# Patient Record
Sex: FEMALE | Race: BLACK OR AFRICAN AMERICAN | NOT HISPANIC OR LATINO | Employment: FULL TIME | URBAN - METROPOLITAN AREA
[De-identification: names, ages, dates, MRNs, and addresses within clinical notes are randomized per-mention and may not be internally consistent; named-entity substitution may affect disease eponyms.]

---

## 2017-06-04 PROBLEM — E11.29 TYPE 2 DIABETES MELLITUS WITH MICROALBUMINURIA, WITHOUT LONG-TERM CURRENT USE OF INSULIN: Status: ACTIVE | Noted: 2017-06-04

## 2017-06-04 PROBLEM — M1A.09X0 IDIOPATHIC CHRONIC GOUT OF MULTIPLE SITES WITHOUT TOPHUS: Status: ACTIVE | Noted: 2017-06-04

## 2017-06-04 PROBLEM — R80.9 TYPE 2 DIABETES MELLITUS WITH MICROALBUMINURIA, WITHOUT LONG-TERM CURRENT USE OF INSULIN: Status: ACTIVE | Noted: 2017-06-04

## 2017-10-04 ENCOUNTER — TELEPHONE (OUTPATIENT)
Dept: ADMINISTRATIVE | Facility: HOSPITAL | Age: 47
End: 2017-10-04

## 2018-02-28 PROBLEM — R60.0 LOCALIZED EDEMA: Status: ACTIVE | Noted: 2018-02-28

## 2018-02-28 PROBLEM — E87.6 HYPOKALEMIA: Status: ACTIVE | Noted: 2018-02-28

## 2018-06-06 PROBLEM — F51.01 PRIMARY INSOMNIA: Status: ACTIVE | Noted: 2018-06-06

## 2018-06-06 PROBLEM — G47.33 OSA ON CPAP: Status: ACTIVE | Noted: 2018-06-06

## 2018-08-04 PROBLEM — N94.89 ADNEXAL MASS: Status: ACTIVE | Noted: 2018-08-04

## 2018-09-18 PROBLEM — Z01.818 PRE-OP EXAM: Status: ACTIVE | Noted: 2018-09-18

## 2018-10-03 PROBLEM — N83.209 CYST OF OVARY: Status: ACTIVE | Noted: 2018-10-03

## 2018-10-03 PROBLEM — Z98.890 S/P LAPAROSCOPIC PROCEDURE: Status: ACTIVE | Noted: 2018-10-03

## 2018-10-03 PROBLEM — N83.209 CYST OF OVARY: Status: RESOLVED | Noted: 2018-10-03 | Resolved: 2018-10-03

## 2018-10-19 ENCOUNTER — TELEPHONE (OUTPATIENT)
Dept: ADMINISTRATIVE | Facility: HOSPITAL | Age: 48
End: 2018-10-19

## 2019-01-16 PROCEDURE — 88312 SPECIAL STAINS GROUP 1: CPT | Performed by: PATHOLOGY

## 2019-01-16 PROCEDURE — 88341 IMHCHEM/IMCYTCHM EA ADD ANTB: CPT | Performed by: PATHOLOGY

## 2019-01-16 PROCEDURE — 88342 IMHCHEM/IMCYTCHM 1ST ANTB: CPT | Performed by: PATHOLOGY

## 2019-02-01 ENCOUNTER — TELEPHONE (OUTPATIENT)
Dept: ADMINISTRATIVE | Facility: HOSPITAL | Age: 49
End: 2019-02-01

## 2019-03-13 PROBLEM — G44.209 TENSION HEADACHE: Status: ACTIVE | Noted: 2019-03-13
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
## 2019-03-13 PROBLEM — S46.911A SHOULDER STRAIN, RIGHT, INITIAL ENCOUNTER: Status: ACTIVE | Noted: 2019-03-13

## 2019-03-28 PROBLEM — Z01.818 PRE-OP EXAM: Status: RESOLVED | Noted: 2018-09-18 | Resolved: 2019-03-28
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
## 2019-07-17 PROBLEM — Z86.0100 HX OF COLONIC POLYPS: Status: ACTIVE | Noted: 2019-07-17

## 2019-07-17 PROBLEM — Z86.010 HX OF COLONIC POLYPS: Status: ACTIVE | Noted: 2019-07-17

## 2019-11-22 ENCOUNTER — PATIENT OUTREACH (OUTPATIENT)
Dept: ADMINISTRATIVE | Facility: HOSPITAL | Age: 49
End: 2019-11-22

## 2020-02-28 PROBLEM — R06.02 SOB (SHORTNESS OF BREATH): Status: ACTIVE | Noted: 2020-02-28

## 2020-02-28 PROBLEM — Z86.0100 HX OF COLONIC POLYPS: Status: RESOLVED | Noted: 2019-07-17 | Resolved: 2020-02-28

## 2020-02-28 PROBLEM — Z86.010 HX OF COLONIC POLYPS: Status: RESOLVED | Noted: 2019-07-17 | Resolved: 2020-02-28

## 2020-06-12 ENCOUNTER — PATIENT OUTREACH (OUTPATIENT)
Dept: ADMINISTRATIVE | Facility: HOSPITAL | Age: 50
End: 2020-06-12

## 2020-06-26 PROBLEM — R31.29 MICROSCOPIC HEMATURIA: Status: ACTIVE | Noted: 2020-06-26

## 2021-06-08 ENCOUNTER — PATIENT OUTREACH (OUTPATIENT)
Dept: ADMINISTRATIVE | Facility: HOSPITAL | Age: 51
End: 2021-06-08

## 2021-06-22 PROBLEM — R07.89 CHEST TIGHTNESS: Status: ACTIVE | Noted: 2021-06-22

## 2021-10-29 LAB
LEFT EYE DM RETINOPATHY: NEGATIVE
RIGHT EYE DM RETINOPATHY: NEGATIVE

## 2021-11-15 ENCOUNTER — PATIENT OUTREACH (OUTPATIENT)
Dept: ADMINISTRATIVE | Facility: HOSPITAL | Age: 51
End: 2021-11-15

## 2021-11-16 ENCOUNTER — PATIENT OUTREACH (OUTPATIENT)
Dept: ADMINISTRATIVE | Facility: HOSPITAL | Age: 51
End: 2021-11-16

## 2022-01-23 DIAGNOSIS — M17.0 BILATERAL PRIMARY OSTEOARTHRITIS OF KNEE: Primary | ICD-10-CM

## 2022-03-07 DIAGNOSIS — Z12.31 OTHER SCREENING MAMMOGRAM: ICD-10-CM

## 2022-04-04 ENCOUNTER — PATIENT MESSAGE (OUTPATIENT)
Dept: ADMINISTRATIVE | Facility: HOSPITAL | Age: 52
End: 2022-04-04

## 2023-01-09 ENCOUNTER — PATIENT MESSAGE (OUTPATIENT)
Dept: ADMINISTRATIVE | Facility: HOSPITAL | Age: 53
End: 2023-01-09
Payer: COMMERCIAL

## 2023-04-03 ENCOUNTER — PATIENT MESSAGE (OUTPATIENT)
Dept: ADMINISTRATIVE | Facility: HOSPITAL | Age: 53
End: 2023-04-03
Payer: COMMERCIAL

## 2023-06-17 DIAGNOSIS — G47.33 OBSTRUCTIVE SLEEP APNEA (ADULT) (PEDIATRIC): Primary | ICD-10-CM

## 2023-07-10 ENCOUNTER — PATIENT MESSAGE (OUTPATIENT)
Dept: ADMINISTRATIVE | Facility: HOSPITAL | Age: 53
End: 2023-07-10
Payer: COMMERCIAL

## 2023-07-19 DIAGNOSIS — Z12.31 OTHER SCREENING MAMMOGRAM: ICD-10-CM

## 2023-10-11 LAB
LEFT EYE DM RETINOPATHY: NEGATIVE
RIGHT EYE DM RETINOPATHY: NEGATIVE

## 2023-10-13 ENCOUNTER — PATIENT OUTREACH (OUTPATIENT)
Dept: ADMINISTRATIVE | Facility: HOSPITAL | Age: 53
End: 2023-10-13
Payer: COMMERCIAL

## 2024-01-24 PROBLEM — J30.1 NON-SEASONAL ALLERGIC RHINITIS DUE TO POLLEN: Status: ACTIVE | Noted: 2024-01-24

## 2024-01-24 PROBLEM — J45.40 RAD (REACTIVE AIRWAY DISEASE) WITH WHEEZING, MODERATE PERSISTENT, UNCOMPLICATED: Status: ACTIVE | Noted: 2024-01-24

## 2024-03-04 DIAGNOSIS — G47.33 OSA ON CPAP: Primary | ICD-10-CM

## 2024-12-26 ENCOUNTER — TELEPHONE (OUTPATIENT)
Dept: ENDOCRINOLOGY | Facility: CLINIC | Age: 54
End: 2024-12-26

## 2025-01-29 ENCOUNTER — TELEPHONE (OUTPATIENT)
Dept: ENDOCRINOLOGY | Facility: CLINIC | Age: 55
End: 2025-01-29

## 2025-01-29 DIAGNOSIS — E66.01 MORBID OBESITY WITH BMI OF 50.0-59.9, ADULT: Primary | ICD-10-CM

## 2025-01-29 NOTE — TELEPHONE ENCOUNTER
----- Message from Ale Lott PA-C sent at 1/29/2025  8:46 AM CST -----  Regarding: Labs  Labs 2 days before appointment please (fasting at 8 am)

## 2025-02-07 ENCOUNTER — LAB VISIT (OUTPATIENT)
Dept: LAB | Facility: HOSPITAL | Age: 55
End: 2025-02-07
Attending: INTERNAL MEDICINE
Payer: COMMERCIAL

## 2025-02-07 DIAGNOSIS — E66.01 MORBID OBESITY WITH BMI OF 50.0-59.9, ADULT: ICD-10-CM

## 2025-02-07 LAB
25(OH)D3+25(OH)D2 SERPL-MCNC: 28 NG/ML (ref 30–96)
ALBUMIN SERPL BCP-MCNC: 3.7 G/DL (ref 3.5–5.2)
ALBUMIN/CREAT UR: 3.7 UG/MG (ref 0–30)
ALP SERPL-CCNC: 74 U/L (ref 55–135)
ALT SERPL W/O P-5'-P-CCNC: 14 U/L (ref 10–44)
ANION GAP SERPL CALC-SCNC: 9 MMOL/L (ref 8–16)
AST SERPL-CCNC: 21 U/L (ref 10–40)
BILIRUB SERPL-MCNC: 0.4 MG/DL (ref 0.1–1)
BUN SERPL-MCNC: 10 MG/DL (ref 6–20)
CALCIUM SERPL-MCNC: 9.1 MG/DL (ref 8.7–10.5)
CHLORIDE SERPL-SCNC: 106 MMOL/L (ref 95–110)
CHOLEST SERPL-MCNC: 201 MG/DL (ref 120–199)
CHOLEST/HDLC SERPL: 3.8 {RATIO} (ref 2–5)
CO2 SERPL-SCNC: 27 MMOL/L (ref 23–29)
CORTIS SERPL-MCNC: 5.8 UG/DL (ref 4.3–22.4)
CREAT SERPL-MCNC: 1 MG/DL (ref 0.5–1.4)
CREAT UR-MCNC: 272.4 MG/DL (ref 15–325)
EST. GFR  (NO RACE VARIABLE): >60 ML/MIN/1.73 M^2
ESTIMATED AVG GLUCOSE: 120 MG/DL (ref 68–131)
GLUCOSE SERPL-MCNC: 91 MG/DL (ref 70–110)
HBA1C MFR BLD: 5.8 % (ref 4–5.6)
HDLC SERPL-MCNC: 53 MG/DL (ref 40–75)
HDLC SERPL: 26.4 % (ref 20–50)
INSULIN COLLECTION INTERVAL: 0
INSULIN SERPL-ACNC: 10.2 UU/ML
LDLC SERPL CALC-MCNC: 131 MG/DL (ref 63–159)
MICROALBUMIN UR DL<=1MG/L-MCNC: 10 UG/ML
NONHDLC SERPL-MCNC: 148 MG/DL
POTASSIUM SERPL-SCNC: 4.3 MMOL/L (ref 3.5–5.1)
PROT SERPL-MCNC: 7.4 G/DL (ref 6–8.4)
SODIUM SERPL-SCNC: 142 MMOL/L (ref 136–145)
TRIGL SERPL-MCNC: 85 MG/DL (ref 30–150)
TSH SERPL DL<=0.005 MIU/L-ACNC: 1.08 UIU/ML (ref 0.4–4)

## 2025-02-07 PROCEDURE — 83036 HEMOGLOBIN GLYCOSYLATED A1C: CPT | Performed by: PHYSICIAN ASSISTANT

## 2025-02-07 PROCEDURE — 82043 UR ALBUMIN QUANTITATIVE: CPT | Performed by: PHYSICIAN ASSISTANT

## 2025-02-07 PROCEDURE — 36415 COLL VENOUS BLD VENIPUNCTURE: CPT | Performed by: PHYSICIAN ASSISTANT

## 2025-02-07 PROCEDURE — 82533 TOTAL CORTISOL: CPT | Performed by: PHYSICIAN ASSISTANT

## 2025-02-07 PROCEDURE — 82306 VITAMIN D 25 HYDROXY: CPT | Performed by: PHYSICIAN ASSISTANT

## 2025-02-07 PROCEDURE — 80053 COMPREHEN METABOLIC PANEL: CPT | Performed by: PHYSICIAN ASSISTANT

## 2025-02-07 PROCEDURE — 83525 ASSAY OF INSULIN: CPT | Performed by: PHYSICIAN ASSISTANT

## 2025-02-07 PROCEDURE — 84443 ASSAY THYROID STIM HORMONE: CPT | Performed by: PHYSICIAN ASSISTANT

## 2025-02-07 PROCEDURE — 80061 LIPID PANEL: CPT | Performed by: PHYSICIAN ASSISTANT

## 2025-02-10 ENCOUNTER — OFFICE VISIT (OUTPATIENT)
Dept: ENDOCRINOLOGY | Facility: CLINIC | Age: 55
End: 2025-02-10
Payer: COMMERCIAL

## 2025-02-10 VITALS
DIASTOLIC BLOOD PRESSURE: 77 MMHG | WEIGHT: 293 LBS | BODY MASS INDEX: 45.99 KG/M2 | HEART RATE: 79 BPM | HEIGHT: 67 IN | RESPIRATION RATE: 18 BRPM | OXYGEN SATURATION: 98 % | SYSTOLIC BLOOD PRESSURE: 154 MMHG

## 2025-02-10 DIAGNOSIS — R80.9 TYPE 2 DIABETES MELLITUS WITH MICROALBUMINURIA, WITHOUT LONG-TERM CURRENT USE OF INSULIN: Primary | ICD-10-CM

## 2025-02-10 DIAGNOSIS — E78.2 MIXED HYPERLIPIDEMIA: ICD-10-CM

## 2025-02-10 DIAGNOSIS — E66.01 MORBID OBESITY WITH BMI OF 50.0-59.9, ADULT: ICD-10-CM

## 2025-02-10 DIAGNOSIS — E55.9 VITAMIN D DEFICIENCY: ICD-10-CM

## 2025-02-10 DIAGNOSIS — E11.29 TYPE 2 DIABETES MELLITUS WITH MICROALBUMINURIA, WITHOUT LONG-TERM CURRENT USE OF INSULIN: Primary | ICD-10-CM

## 2025-02-10 PROCEDURE — 3044F HG A1C LEVEL LT 7.0%: CPT | Mod: CPTII,S$GLB,, | Performed by: PHYSICIAN ASSISTANT

## 2025-02-10 PROCEDURE — 3077F SYST BP >= 140 MM HG: CPT | Mod: CPTII,S$GLB,, | Performed by: PHYSICIAN ASSISTANT

## 2025-02-10 PROCEDURE — 3008F BODY MASS INDEX DOCD: CPT | Mod: CPTII,S$GLB,, | Performed by: PHYSICIAN ASSISTANT

## 2025-02-10 PROCEDURE — 3078F DIAST BP <80 MM HG: CPT | Mod: CPTII,S$GLB,, | Performed by: PHYSICIAN ASSISTANT

## 2025-02-10 PROCEDURE — 99999 PR PBB SHADOW E&M-EST. PATIENT-LVL V: CPT | Mod: PBBFAC,,, | Performed by: PHYSICIAN ASSISTANT

## 2025-02-10 PROCEDURE — 3061F NEG MICROALBUMINURIA REV: CPT | Mod: CPTII,S$GLB,, | Performed by: PHYSICIAN ASSISTANT

## 2025-02-10 PROCEDURE — 99204 OFFICE O/P NEW MOD 45 MIN: CPT | Mod: S$GLB,,, | Performed by: PHYSICIAN ASSISTANT

## 2025-02-10 PROCEDURE — 1159F MED LIST DOCD IN RCRD: CPT | Mod: CPTII,S$GLB,, | Performed by: PHYSICIAN ASSISTANT

## 2025-02-10 PROCEDURE — 3066F NEPHROPATHY DOC TX: CPT | Mod: CPTII,S$GLB,, | Performed by: PHYSICIAN ASSISTANT

## 2025-02-10 RX ORDER — ATORVASTATIN CALCIUM 40 MG/1
40 TABLET, FILM COATED ORAL DAILY
Qty: 90 TABLET | Refills: 3 | Status: SHIPPED | OUTPATIENT
Start: 2025-02-10 | End: 2026-02-10

## 2025-02-10 RX ORDER — TIRZEPATIDE 12.5 MG/.5ML
12.5 INJECTION, SOLUTION SUBCUTANEOUS
Qty: 4 PEN | Refills: 11 | Status: SHIPPED | OUTPATIENT
Start: 2025-02-10

## 2025-02-10 NOTE — ASSESSMENT & PLAN NOTE
Last Vitamin D improved to 28 from 12. She is inconsistent with taking D3 50,000 IU once weekly. Encouraged consistency.

## 2025-02-10 NOTE — ASSESSMENT & PLAN NOTE
Glucose controlled with A1c of 5.8%. POCT glucose between . She reports plateau in weight loss. Will switch to GLP-1/GIP RA. She is taking Glipizide 5 mg as needed for large meals but is onkly taking every 2 weeks. Will stop     Plan   - Stop Ozempic 2 mg once weekly  - Start Mounjaro 12.5 mg once weekly   - Stop Glipizide     Follow up 2 months

## 2025-02-10 NOTE — PROGRESS NOTES
Subjective:      Patient ID: Ian Dent is a 54 y.o. female.    Chief Complaint:  T2DM    History of Present Illness  This is a 54 y.o. female. with a past medical history of T2DM, HTN, HLD here for evaluation of T2DM.    Type 2 diabetes mellitus  Diagnosed around age 50    Current diabetes medications:  - Ozempic 2 mg once weekly   - Glipizide 5 mg once daily PRN     Past diabetes medications:  - Mounjaro - drug shortage   - Farxiga - UTI and yeast infection   - Metformin   - Jardiance - UTI and yeast infection, [facility]EN     Lab Results   Component Value Date    CREATININE 1.0 2025    EGFRNORACEVR >60.0 2025     Known diabetic complications: none    Weight trend:  Wt Readings from Last 8 Encounters:   02/10/25 (!) 165.3 kg (364 lb 6.4 oz)   25 (!) 163.8 kg (361 lb 1.8 oz)   24 (!) 162.4 kg (358 lb 0.4 oz)   24 (!) 159.6 kg (351 lb 13.7 oz)   24 (!) 159.2 kg (351 lb)   24 (!) 158.1 kg (348 lb 8.8 oz)   24 (!) 159.5 kg (351 lb 10.1 oz)   24 (!) 159 kg (350 lb 8.5 oz)     Family history of diabetes:  Father, Mother    Prior visit with diabetes education: Yes    Current diet: 2-3 meals per day. She is a  so that limits her meals at times  Current exercise: She states she used to be active, but she is currently in a camper which has limited her exercise.     Blood glucose monitoring at home: Yes  Home blood sugar records:    Fastin-120  Any episodes of hypoglycemia? Yes, 60s    Diabetes Management Status  Statin: Taking  ACE/ARB: Taking    Screening or Prevention Patient's value   HgA1C Testing and Control   Lab Results   Component Value Date    HGBA1C 5.8 (H) 2025        LDL control Lab Results   Component Value Date    LDLCALC 131.0 2025      Nephropathy screening Lab Results   Component Value Date    MICALBCREAT 3.7 2025        Lab Results   Component Value Date    TSH 1.076 2025     Last eye exam: :  "10/11/2023    Review of Systems  As above    Social and family history reviewed  Current medications and allergies reviewed    Objective:   BP (!) 154/77   Pulse 79   Resp 18   Ht 5' 7" (1.702 m)   Wt (!) 165.3 kg (364 lb 6.4 oz)   SpO2 98%   BMI 57.07 kg/m²   Physical Exam  Alert, oriented    BP Readings from Last 1 Encounters:   02/10/25 (!) 154/77      Wt Readings from Last 1 Encounters:   02/10/25 0938 (!) 165.3 kg (364 lb 6.4 oz)     Body mass index is 57.07 kg/m².    Lab Review:   Lab Results   Component Value Date    HGBA1C 5.8 (H) 02/07/2025     Lab Results   Component Value Date    CHOL 201 (H) 02/07/2025    HDL 53 02/07/2025    LDLCALC 131.0 02/07/2025    TRIG 85 02/07/2025    CHOLHDL 26.4 02/07/2025     Lab Results   Component Value Date     02/07/2025    K 4.3 02/07/2025     02/07/2025    CO2 27 02/07/2025    GLU 91 02/07/2025    BUN 10 02/07/2025    CREATININE 1.0 02/07/2025    CALCIUM 9.1 02/07/2025    PROT 7.4 02/07/2025    ALBUMIN 3.7 02/07/2025    BILITOT 0.4 02/07/2025    ALKPHOS 74 02/07/2025    AST 21 02/07/2025    ALT 14 02/07/2025    ANIONGAP 9 02/07/2025    ESTGFRAFRICA >60.0 06/13/2022    EGFRNONAA >60.0 06/13/2022    TSH 1.076 02/07/2025     All pertinent labs reviewed    Assessment and Plan     Type 2 diabetes mellitus with microalbuminuria, without long-term current use of insulin  Glucose controlled with A1c of 5.8%. POCT glucose between . She reports plateau in weight loss. Will switch to GLP-1/GIP RA. She is taking Glipizide 5 mg as needed for large meals but is onkly taking every 2 weeks. Will stop     Plan   - Stop Ozempic 2 mg once weekly  - Start Mounjaro 12.5 mg once weekly   - Stop Glipizide     Follow up 2 months    Morbid obesity with BMI of 50.0-59.9, adult  Stop GLP-1 RA and start GLP-1/GIP RA for weight loss benefit     Vitamin D deficiency  Last Vitamin D improved to 28 from 12. She is inconsistent with taking D3 50,000 IU once weekly. Encouraged " consistency.     Mixed hyperlipidemia  Last LD of 131. Currently on Pravastatin 40 mg once weekly. ASCVD risk of 7.6%. High intensity statin needed. Switch to Atorvastatin 40 mg     Follow-up in 2 months    Ale Lott PA-C   Endocrinology    100

## 2025-02-10 NOTE — ASSESSMENT & PLAN NOTE
Last LD of 131. Currently on Pravastatin 40 mg once weekly. ASCVD risk of 7.6%. High intensity statin needed. Switch to Atorvastatin 40 mg

## 2025-03-31 ENCOUNTER — PATIENT MESSAGE (OUTPATIENT)
Dept: ADMINISTRATIVE | Facility: HOSPITAL | Age: 55
End: 2025-03-31
Payer: COMMERCIAL

## 2025-03-31 ENCOUNTER — OFFICE VISIT (OUTPATIENT)
Dept: ENDOCRINOLOGY | Facility: CLINIC | Age: 55
End: 2025-03-31
Payer: COMMERCIAL

## 2025-03-31 VITALS
HEIGHT: 67 IN | RESPIRATION RATE: 18 BRPM | OXYGEN SATURATION: 97 % | DIASTOLIC BLOOD PRESSURE: 75 MMHG | SYSTOLIC BLOOD PRESSURE: 134 MMHG | BODY MASS INDEX: 45.99 KG/M2 | HEART RATE: 73 BPM | WEIGHT: 293 LBS

## 2025-03-31 DIAGNOSIS — R80.9 TYPE 2 DIABETES MELLITUS WITH MICROALBUMINURIA, WITHOUT LONG-TERM CURRENT USE OF INSULIN: Primary | ICD-10-CM

## 2025-03-31 DIAGNOSIS — E78.2 MIXED HYPERLIPIDEMIA: ICD-10-CM

## 2025-03-31 DIAGNOSIS — E66.01 MORBID OBESITY WITH BMI OF 50.0-59.9, ADULT: Primary | ICD-10-CM

## 2025-03-31 DIAGNOSIS — E11.29 TYPE 2 DIABETES MELLITUS WITH MICROALBUMINURIA, WITHOUT LONG-TERM CURRENT USE OF INSULIN: Primary | ICD-10-CM

## 2025-03-31 DIAGNOSIS — E66.01 MORBID OBESITY WITH BMI OF 50.0-59.9, ADULT: ICD-10-CM

## 2025-03-31 PROCEDURE — 3044F HG A1C LEVEL LT 7.0%: CPT | Mod: CPTII,S$GLB,, | Performed by: PHYSICIAN ASSISTANT

## 2025-03-31 PROCEDURE — 3075F SYST BP GE 130 - 139MM HG: CPT | Mod: CPTII,S$GLB,, | Performed by: PHYSICIAN ASSISTANT

## 2025-03-31 PROCEDURE — 3066F NEPHROPATHY DOC TX: CPT | Mod: CPTII,S$GLB,, | Performed by: PHYSICIAN ASSISTANT

## 2025-03-31 PROCEDURE — 3061F NEG MICROALBUMINURIA REV: CPT | Mod: CPTII,S$GLB,, | Performed by: PHYSICIAN ASSISTANT

## 2025-03-31 PROCEDURE — 99999 PR PBB SHADOW E&M-EST. PATIENT-LVL V: CPT | Mod: PBBFAC,,, | Performed by: PHYSICIAN ASSISTANT

## 2025-03-31 PROCEDURE — 1159F MED LIST DOCD IN RCRD: CPT | Mod: CPTII,S$GLB,, | Performed by: PHYSICIAN ASSISTANT

## 2025-03-31 PROCEDURE — 99214 OFFICE O/P EST MOD 30 MIN: CPT | Mod: S$GLB,,, | Performed by: PHYSICIAN ASSISTANT

## 2025-03-31 PROCEDURE — G2211 COMPLEX E/M VISIT ADD ON: HCPCS | Mod: S$GLB,,, | Performed by: PHYSICIAN ASSISTANT

## 2025-03-31 PROCEDURE — 3008F BODY MASS INDEX DOCD: CPT | Mod: CPTII,S$GLB,, | Performed by: PHYSICIAN ASSISTANT

## 2025-03-31 PROCEDURE — 3078F DIAST BP <80 MM HG: CPT | Mod: CPTII,S$GLB,, | Performed by: PHYSICIAN ASSISTANT

## 2025-03-31 NOTE — PROGRESS NOTES
Subjective:      Patient ID: Ian Dent is a 54 y.o. female.    Chief Complaint:  T2DM    History of Present Illness  This is a 54 y.o. female. with a past medical history of T2DM, HTN, HLD here for evaluation of T2DM.    Type 2 diabetes mellitus  Diagnosed around age 50    Current diabetes medications:  - Mounjaro 12.5 mg once weekly     Past diabetes medications:  - Farxiga - UTI and yeast infection   - Metformin   - Jardiance - UTI and yeast infection, [facility]   - Ozempic - failure   - Glipizide    Lab Results   Component Value Date    CREATININE 1.0 02/07/2025    EGFRNORACEVR >60.0 02/07/2025     Known diabetic complications: none    Weight trend:  Wt Readings from Last 8 Encounters:   03/31/25 (!) 160.7 kg (354 lb 3.2 oz)   02/10/25 (!) 165.3 kg (364 lb 6.4 oz)   02/05/25 (!) 163.8 kg (361 lb 1.8 oz)   11/07/24 (!) 162.4 kg (358 lb 0.4 oz)   09/30/24 (!) 159.6 kg (351 lb 13.7 oz)   07/24/24 (!) 159.2 kg (351 lb)   05/08/24 (!) 158.1 kg (348 lb 8.8 oz)   04/29/24 (!) 159.5 kg (351 lb 10.1 oz)     Family history of diabetes:  Father, Mother    Prior visit with diabetes education: Yes    Current diet: 2-3 meals per day. She is a  so that limits her meals at times  Current exercise: She states she used to be active, but she is currently in a camper which has limited her exercise.     Blood glucose monitoring at home: No     Diabetes Management Status  Statin: Taking  ACE/ARB: Taking    Screening or Prevention Patient's value   HgA1C Testing and Control   Lab Results   Component Value Date    HGBA1C 5.8 (H) 02/07/2025        LDL control Lab Results   Component Value Date    LDLCALC 131.0 02/07/2025      Nephropathy screening Lab Results   Component Value Date    MICALBCREAT 3.7 02/07/2025        Lab Results   Component Value Date    TSH 1.076 02/07/2025     Last eye exam: : 10/11/2023    Review of Systems  As above    Social and family history reviewed  Current medications and allergies  "reviewed    Objective:   /75   Pulse 73   Resp 18   Ht 5' 7" (1.702 m)   Wt (!) 160.7 kg (354 lb 3.2 oz)   SpO2 97%   BMI 55.48 kg/m²   Physical Exam  Alert, oriented    BP Readings from Last 1 Encounters:   03/31/25 134/75      Wt Readings from Last 1 Encounters:   03/31/25 0958 (!) 160.7 kg (354 lb 3.2 oz)     Body mass index is 55.48 kg/m².    Lab Review:   Lab Results   Component Value Date    HGBA1C 5.8 (H) 02/07/2025     Lab Results   Component Value Date    CHOL 201 (H) 02/07/2025    HDL 53 02/07/2025    LDLCALC 131.0 02/07/2025    TRIG 85 02/07/2025    CHOLHDL 26.4 02/07/2025     Lab Results   Component Value Date     02/07/2025    K 4.3 02/07/2025     02/07/2025    CO2 27 02/07/2025    GLU 91 02/07/2025    BUN 10 02/07/2025    CREATININE 1.0 02/07/2025    CALCIUM 9.1 02/07/2025    PROT 7.4 02/07/2025    ALBUMIN 3.7 02/07/2025    BILITOT 0.4 02/07/2025    ALKPHOS 74 02/07/2025    AST 21 02/07/2025    ALT 14 02/07/2025    ANIONGAP 9 02/07/2025    ESTGFRAFRICA >60.0 06/13/2022    EGFRNONAA >60.0 06/13/2022    TSH 1.076 02/07/2025     All pertinent labs reviewed    Assessment and Plan     Type 2 diabetes mellitus with microalbuminuria, without long-term current use of insulin  Glucose controlled with A1c of 5.8%. She is tolerating GLP-1/GIP RA well without any side effects. She has lost 10 pounds in 7 weeks. I congratulated her on this. She is interested in weight loss surgery. Will send referral. She has been exercising multiple times per week and has improved her diet. Will continue same dose of Mounjaro as she has seen progressive weight loss since starting. Could consider increasing in 2-3 months if weight plateaus.     Plan   - Continue Mounjaro 12.5 mg once weekly     Follow up 5 months     Morbid obesity with BMI of 50.0-59.9, adult  Continue GLP-1/GIP RA for weight loss benefit     Mixed hyperlipidemia  Continue statin    Ale Lott PA-C   Endocrinology   "

## 2025-03-31 NOTE — ASSESSMENT & PLAN NOTE
Glucose controlled with A1c of 5.8%. She is tolerating GLP-1/GIP RA well without any side effects. She has lost 10 pounds in 7 weeks. I congratulated her on this. She is interested in weight loss surgery. Will send referral. She has been exercising multiple times per week and has improved her diet. Will continue same dose of Mounjaro as she has seen progressive weight loss since starting. Could consider increasing in 2-3 months if weight plateaus.     Plan   - Continue Mounjaro 12.5 mg once weekly     Follow up 5 months

## 2025-04-01 ENCOUNTER — TELEPHONE (OUTPATIENT)
Dept: BARIATRICS | Facility: CLINIC | Age: 55
End: 2025-04-01
Payer: COMMERCIAL

## 2025-04-09 ENCOUNTER — TELEPHONE (OUTPATIENT)
Dept: BARIATRICS | Facility: CLINIC | Age: 55
End: 2025-04-09
Payer: COMMERCIAL

## 2025-04-10 ENCOUNTER — TELEPHONE (OUTPATIENT)
Dept: BARIATRICS | Facility: CLINIC | Age: 55
End: 2025-04-10
Payer: COMMERCIAL

## 2025-04-10 NOTE — TELEPHONE ENCOUNTER
Called PT to schedule consults. No answer. Left voice message.   Patient's daughter, Gayle Stallworth, called. Patient has had increased leg swelling for the past few days and she has been more tired than usual.  Patient currently takes Lasix 20 mg every other day. Should she increase this? Please advise.

## 2025-04-13 DIAGNOSIS — R80.9 TYPE 2 DIABETES MELLITUS WITH MICROALBUMINURIA, WITHOUT LONG-TERM CURRENT USE OF INSULIN: ICD-10-CM

## 2025-04-13 DIAGNOSIS — E11.29 TYPE 2 DIABETES MELLITUS WITH MICROALBUMINURIA, WITHOUT LONG-TERM CURRENT USE OF INSULIN: ICD-10-CM

## 2025-04-13 RX ORDER — TIRZEPATIDE 12.5 MG/.5ML
12.5 INJECTION, SOLUTION SUBCUTANEOUS
Qty: 4 PEN | Refills: 11 | Status: CANCELLED | OUTPATIENT
Start: 2025-04-13

## 2025-04-14 ENCOUNTER — TELEPHONE (OUTPATIENT)
Dept: BARIATRICS | Facility: CLINIC | Age: 55
End: 2025-04-14
Payer: COMMERCIAL

## 2025-04-14 DIAGNOSIS — E11.29 TYPE 2 DIABETES MELLITUS WITH MICROALBUMINURIA, WITHOUT LONG-TERM CURRENT USE OF INSULIN: ICD-10-CM

## 2025-04-14 DIAGNOSIS — R80.9 TYPE 2 DIABETES MELLITUS WITH MICROALBUMINURIA, WITHOUT LONG-TERM CURRENT USE OF INSULIN: ICD-10-CM

## 2025-04-14 RX ORDER — TIRZEPATIDE 12.5 MG/.5ML
12.5 INJECTION, SOLUTION SUBCUTANEOUS
Qty: 4 PEN | Refills: 11 | Status: SHIPPED | OUTPATIENT
Start: 2025-04-14

## 2025-04-21 ENCOUNTER — LAB VISIT (OUTPATIENT)
Dept: LAB | Facility: HOSPITAL | Age: 55
End: 2025-04-21
Attending: INTERNAL MEDICINE
Payer: COMMERCIAL

## 2025-04-21 ENCOUNTER — TELEPHONE (OUTPATIENT)
Dept: BARIATRICS | Facility: CLINIC | Age: 55
End: 2025-04-21
Payer: COMMERCIAL

## 2025-04-21 DIAGNOSIS — R80.9 TYPE 2 DIABETES MELLITUS WITH MICROALBUMINURIA, WITHOUT LONG-TERM CURRENT USE OF INSULIN: ICD-10-CM

## 2025-04-21 DIAGNOSIS — E11.29 TYPE 2 DIABETES MELLITUS WITH MICROALBUMINURIA, WITHOUT LONG-TERM CURRENT USE OF INSULIN: ICD-10-CM

## 2025-04-21 DIAGNOSIS — I10 ESSENTIAL HYPERTENSION: ICD-10-CM

## 2025-04-21 DIAGNOSIS — E78.2 MIXED HYPERLIPIDEMIA: ICD-10-CM

## 2025-04-21 LAB
ABSOLUTE EOSINOPHIL (OHS): 0.06 K/UL
ABSOLUTE MONOCYTE (OHS): 0.45 K/UL (ref 0.3–1)
ABSOLUTE NEUTROPHIL COUNT (OHS): 3.61 K/UL (ref 1.8–7.7)
ALBUMIN SERPL BCP-MCNC: 3.4 G/DL (ref 3.5–5.2)
ALP SERPL-CCNC: 86 UNIT/L (ref 40–150)
ALT SERPL W/O P-5'-P-CCNC: 13 UNIT/L (ref 10–44)
ANION GAP (OHS): 8 MMOL/L (ref 8–16)
AST SERPL-CCNC: 14 UNIT/L (ref 11–45)
BACTERIA #/AREA URNS AUTO: ABNORMAL /HPF
BASOPHILS # BLD AUTO: 0.03 K/UL
BASOPHILS NFR BLD AUTO: 0.4 %
BILIRUB SERPL-MCNC: 0.4 MG/DL (ref 0.1–1)
BILIRUB UR QL STRIP.AUTO: NEGATIVE
BUN SERPL-MCNC: 10 MG/DL (ref 6–20)
CALCIUM SERPL-MCNC: 8.6 MG/DL (ref 8.7–10.5)
CHLORIDE SERPL-SCNC: 104 MMOL/L (ref 95–110)
CHOLEST SERPL-MCNC: 177 MG/DL (ref 120–199)
CHOLEST/HDLC SERPL: 3.4 {RATIO} (ref 2–5)
CLARITY UR: CLEAR
CO2 SERPL-SCNC: 28 MMOL/L (ref 23–29)
COLOR UR AUTO: YELLOW
CREAT SERPL-MCNC: 0.9 MG/DL (ref 0.5–1.4)
EAG (OHS): 123 MG/DL (ref 68–131)
ERYTHROCYTE [DISTWIDTH] IN BLOOD BY AUTOMATED COUNT: 16.5 % (ref 11.5–14.5)
GFR SERPLBLD CREATININE-BSD FMLA CKD-EPI: >60 ML/MIN/1.73/M2
GLUCOSE SERPL-MCNC: 101 MG/DL (ref 70–110)
GLUCOSE UR QL STRIP: NEGATIVE
HBA1C MFR BLD: 5.9 % (ref 4–5.6)
HCT VFR BLD AUTO: 43.4 % (ref 37–48.5)
HDLC SERPL-MCNC: 52 MG/DL (ref 40–75)
HDLC SERPL: 29.4 % (ref 20–50)
HGB BLD-MCNC: 13.5 GM/DL (ref 12–16)
HGB UR QL STRIP: NEGATIVE
HYALINE CASTS UR QL AUTO: 2 /LPF (ref 0–1)
IMM GRANULOCYTES # BLD AUTO: 0.01 K/UL (ref 0–0.04)
IMM GRANULOCYTES NFR BLD AUTO: 0.1 % (ref 0–0.5)
KETONES UR QL STRIP: ABNORMAL
LDLC SERPL CALC-MCNC: 105.2 MG/DL (ref 63–159)
LEUKOCYTE ESTERASE UR QL STRIP: ABNORMAL
LYMPHOCYTES # BLD AUTO: 3.34 K/UL (ref 1–4.8)
MCH RBC QN AUTO: 27 PG (ref 27–31)
MCHC RBC AUTO-ENTMCNC: 31.1 G/DL (ref 32–36)
MCV RBC AUTO: 87 FL (ref 82–98)
MICROSCOPIC COMMENT: ABNORMAL
NITRITE UR QL STRIP: NEGATIVE
NONHDLC SERPL-MCNC: 125 MG/DL
NUCLEATED RBC (/100WBC) (OHS): 0 /100 WBC
PH UR STRIP: 6 [PH]
PLATELET # BLD AUTO: 234 K/UL (ref 150–450)
PMV BLD AUTO: 11.7 FL (ref 9.2–12.9)
POTASSIUM SERPL-SCNC: 4 MMOL/L (ref 3.5–5.1)
PROT SERPL-MCNC: 6.9 GM/DL (ref 6–8.4)
PROT UR QL STRIP: NEGATIVE
RBC # BLD AUTO: 5 M/UL (ref 4–5.4)
RBC #/AREA URNS AUTO: 1 /HPF (ref 0–4)
RELATIVE EOSINOPHIL (OHS): 0.8 %
RELATIVE LYMPHOCYTE (OHS): 44.5 % (ref 18–48)
RELATIVE MONOCYTE (OHS): 6 % (ref 4–15)
RELATIVE NEUTROPHIL (OHS): 48.2 % (ref 38–73)
SODIUM SERPL-SCNC: 140 MMOL/L (ref 136–145)
SP GR UR STRIP: 1.02
SQUAMOUS #/AREA URNS AUTO: 0 /HPF
TRIGL SERPL-MCNC: 99 MG/DL (ref 30–150)
TSH SERPL-ACNC: 1.09 UIU/ML (ref 0.4–4)
UROBILINOGEN UR STRIP-ACNC: 1 EU/DL
WBC # BLD AUTO: 7.5 K/UL (ref 3.9–12.7)
WBC #/AREA URNS AUTO: 1 /HPF (ref 0–5)

## 2025-04-21 PROCEDURE — 84443 ASSAY THYROID STIM HORMONE: CPT

## 2025-04-21 PROCEDURE — 36415 COLL VENOUS BLD VENIPUNCTURE: CPT

## 2025-04-21 PROCEDURE — 81003 URINALYSIS AUTO W/O SCOPE: CPT

## 2025-04-21 PROCEDURE — 80061 LIPID PANEL: CPT

## 2025-04-21 PROCEDURE — 80053 COMPREHEN METABOLIC PANEL: CPT

## 2025-04-21 PROCEDURE — 85025 COMPLETE CBC W/AUTO DIFF WBC: CPT

## 2025-04-21 PROCEDURE — 83036 HEMOGLOBIN GLYCOSYLATED A1C: CPT

## 2025-04-21 NOTE — TELEPHONE ENCOUNTER
Called pt to schedule Micheal consult/Fc appt. No answer, LVM w/cb name and number. Will send portal message.

## 2025-04-21 NOTE — TELEPHONE ENCOUNTER
----- Message from Ale Lott PA-C sent at 3/31/2025 12:47 PM CDT -----  Regarding: Bariatric surgery  Good afternoon, I am Ale Lott PA-C working in [facility] in Endocrinology. I have this patient who is very interested in weight loss surgery. I put in referral and wanted to see if she could be scheduled soon? Thanks, Ale Lott PA-C Endocrinology

## 2025-04-24 ENCOUNTER — TELEPHONE (OUTPATIENT)
Dept: BARIATRICS | Facility: CLINIC | Age: 55
End: 2025-04-24
Payer: COMMERCIAL

## 2025-05-05 ENCOUNTER — TELEPHONE (OUTPATIENT)
Dept: BARIATRICS | Facility: CLINIC | Age: 55
End: 2025-05-05
Payer: COMMERCIAL

## 2025-05-05 NOTE — TELEPHONE ENCOUNTER
----- Message from Chelsey sent at 4/14/2025  4:08 PM CDT -----  Regarding: called to schedule leonela/dietitian consult. lvm/pm 2nd attempt  called to schedule leonela/dietitian consult. lvm/pm 2nd attempt

## 2025-05-23 ENCOUNTER — TELEPHONE (OUTPATIENT)
Dept: BARIATRICS | Facility: CLINIC | Age: 55
End: 2025-05-23
Payer: COMMERCIAL

## 2025-05-23 NOTE — TELEPHONE ENCOUNTER
----- Message from Ale Lott PA-C sent at 3/31/2025 12:47 PM CDT -----  Regarding: Bariatric surgery  Good afternoon, I am Ale Lott PA-C working in [facility] in Endocrinology. I have this patient who is very interested in weight loss surgery. I put in referral and wanted to see if she could be scheduled soon? Thanks, Ale Lott PA-C Endocrinology

## 2025-06-02 ENCOUNTER — CLINICAL SUPPORT (OUTPATIENT)
Dept: BARIATRICS | Facility: CLINIC | Age: 55
End: 2025-06-02
Payer: COMMERCIAL

## 2025-06-02 ENCOUNTER — OFFICE VISIT (OUTPATIENT)
Dept: BARIATRICS | Facility: CLINIC | Age: 55
End: 2025-06-02
Payer: COMMERCIAL

## 2025-06-02 VITALS
TEMPERATURE: 98 F | OXYGEN SATURATION: 98 % | DIASTOLIC BLOOD PRESSURE: 89 MMHG | WEIGHT: 293 LBS | HEART RATE: 84 BPM | SYSTOLIC BLOOD PRESSURE: 143 MMHG | BODY MASS INDEX: 45.99 KG/M2 | HEIGHT: 67 IN

## 2025-06-02 VITALS — BODY MASS INDEX: 45.99 KG/M2 | WEIGHT: 293 LBS | HEIGHT: 67 IN

## 2025-06-02 DIAGNOSIS — E11.29 TYPE 2 DIABETES MELLITUS WITH MICROALBUMINURIA, WITHOUT LONG-TERM CURRENT USE OF INSULIN: ICD-10-CM

## 2025-06-02 DIAGNOSIS — E78.2 MIXED HYPERLIPIDEMIA: ICD-10-CM

## 2025-06-02 DIAGNOSIS — I10 ESSENTIAL HYPERTENSION: Chronic | ICD-10-CM

## 2025-06-02 DIAGNOSIS — E66.01 MORBID OBESITY WITH BMI OF 50.0-59.9, ADULT: Primary | ICD-10-CM

## 2025-06-02 DIAGNOSIS — E66.01 MORBID OBESITY WITH BMI OF 50.0-59.9, ADULT: ICD-10-CM

## 2025-06-02 DIAGNOSIS — K21.00 GASTROESOPHAGEAL REFLUX DISEASE WITH ESOPHAGITIS WITHOUT HEMORRHAGE: Primary | ICD-10-CM

## 2025-06-02 DIAGNOSIS — R80.9 TYPE 2 DIABETES MELLITUS WITH MICROALBUMINURIA, WITHOUT LONG-TERM CURRENT USE OF INSULIN: ICD-10-CM

## 2025-06-02 PROCEDURE — 3079F DIAST BP 80-89 MM HG: CPT | Mod: CPTII,S$GLB,, | Performed by: NURSE PRACTITIONER

## 2025-06-02 PROCEDURE — 97802 MEDICAL NUTRITION INDIV IN: CPT | Mod: S$GLB,,, | Performed by: DIETITIAN, REGISTERED

## 2025-06-02 PROCEDURE — 99999 PR PBB SHADOW E&M-EST. PATIENT-LVL V: CPT | Mod: PBBFAC,,, | Performed by: NURSE PRACTITIONER

## 2025-06-02 PROCEDURE — 3061F NEG MICROALBUMINURIA REV: CPT | Mod: CPTII,S$GLB,, | Performed by: NURSE PRACTITIONER

## 2025-06-02 PROCEDURE — 99205 OFFICE O/P NEW HI 60 MIN: CPT | Mod: S$GLB,,, | Performed by: NURSE PRACTITIONER

## 2025-06-02 PROCEDURE — 1160F RVW MEDS BY RX/DR IN RCRD: CPT | Mod: CPTII,S$GLB,, | Performed by: NURSE PRACTITIONER

## 2025-06-02 PROCEDURE — 3008F BODY MASS INDEX DOCD: CPT | Mod: CPTII,S$GLB,, | Performed by: NURSE PRACTITIONER

## 2025-06-02 PROCEDURE — 3077F SYST BP >= 140 MM HG: CPT | Mod: CPTII,S$GLB,, | Performed by: NURSE PRACTITIONER

## 2025-06-02 PROCEDURE — 1159F MED LIST DOCD IN RCRD: CPT | Mod: CPTII,S$GLB,, | Performed by: NURSE PRACTITIONER

## 2025-06-02 PROCEDURE — 3044F HG A1C LEVEL LT 7.0%: CPT | Mod: CPTII,S$GLB,, | Performed by: NURSE PRACTITIONER

## 2025-06-02 PROCEDURE — 3066F NEPHROPATHY DOC TX: CPT | Mod: CPTII,S$GLB,, | Performed by: NURSE PRACTITIONER

## 2025-06-02 PROCEDURE — 99999 PR PBB SHADOW E&M-EST. PATIENT-LVL II: CPT | Mod: PBBFAC,,, | Performed by: DIETITIAN, REGISTERED

## 2025-06-03 ENCOUNTER — PATIENT MESSAGE (OUTPATIENT)
Dept: BARIATRICS | Facility: CLINIC | Age: 55
End: 2025-06-03
Payer: COMMERCIAL

## 2025-06-03 ENCOUNTER — HOSPITAL ENCOUNTER (OUTPATIENT)
Dept: PULMONOLOGY | Facility: HOSPITAL | Age: 55
Discharge: HOME | End: 2025-06-03
Attending: NURSE PRACTITIONER
Payer: COMMERCIAL

## 2025-06-03 ENCOUNTER — OFFICE VISIT (OUTPATIENT)
Dept: CARDIOLOGY | Facility: CLINIC | Age: 55
End: 2025-06-03
Payer: COMMERCIAL

## 2025-06-03 ENCOUNTER — TELEPHONE (OUTPATIENT)
Dept: BARIATRICS | Facility: CLINIC | Age: 55
End: 2025-06-03
Payer: COMMERCIAL

## 2025-06-03 VITALS
HEIGHT: 67 IN | BODY MASS INDEX: 45.99 KG/M2 | HEART RATE: 80 BPM | OXYGEN SATURATION: 99 % | WEIGHT: 293 LBS | DIASTOLIC BLOOD PRESSURE: 88 MMHG | RESPIRATION RATE: 18 BRPM | SYSTOLIC BLOOD PRESSURE: 143 MMHG

## 2025-06-03 DIAGNOSIS — R80.9 TYPE 2 DIABETES MELLITUS WITH MICROALBUMINURIA, WITHOUT LONG-TERM CURRENT USE OF INSULIN: ICD-10-CM

## 2025-06-03 DIAGNOSIS — R07.89 CHEST TIGHTNESS: ICD-10-CM

## 2025-06-03 DIAGNOSIS — Z71.89 ENCOUNTER FOR PRE-BARIATRIC SURGERY COUNSELING AND EDUCATION: ICD-10-CM

## 2025-06-03 DIAGNOSIS — I10 ESSENTIAL HYPERTENSION: Primary | Chronic | ICD-10-CM

## 2025-06-03 DIAGNOSIS — E66.01 MORBID OBESITY WITH BMI OF 50.0-59.9, ADULT: ICD-10-CM

## 2025-06-03 DIAGNOSIS — E78.2 MIXED HYPERLIPIDEMIA: ICD-10-CM

## 2025-06-03 DIAGNOSIS — Z71.89 CARDIAC RISK COUNSELING: ICD-10-CM

## 2025-06-03 DIAGNOSIS — E11.29 TYPE 2 DIABETES MELLITUS WITH MICROALBUMINURIA, WITHOUT LONG-TERM CURRENT USE OF INSULIN: ICD-10-CM

## 2025-06-03 PROCEDURE — 93005 ELECTROCARDIOGRAM TRACING: CPT

## 2025-06-03 PROCEDURE — 1159F MED LIST DOCD IN RCRD: CPT | Mod: CPTII,S$GLB,, | Performed by: NURSE PRACTITIONER

## 2025-06-03 PROCEDURE — 3044F HG A1C LEVEL LT 7.0%: CPT | Mod: CPTII,S$GLB,, | Performed by: NURSE PRACTITIONER

## 2025-06-03 PROCEDURE — 3077F SYST BP >= 140 MM HG: CPT | Mod: CPTII,S$GLB,, | Performed by: NURSE PRACTITIONER

## 2025-06-03 PROCEDURE — 99204 OFFICE O/P NEW MOD 45 MIN: CPT | Mod: 25,S$GLB,, | Performed by: NURSE PRACTITIONER

## 2025-06-03 PROCEDURE — 3008F BODY MASS INDEX DOCD: CPT | Mod: CPTII,S$GLB,, | Performed by: NURSE PRACTITIONER

## 2025-06-03 PROCEDURE — 3066F NEPHROPATHY DOC TX: CPT | Mod: CPTII,S$GLB,, | Performed by: NURSE PRACTITIONER

## 2025-06-03 PROCEDURE — 93010 ELECTROCARDIOGRAM REPORT: CPT | Mod: ,,, | Performed by: INTERNAL MEDICINE

## 2025-06-03 PROCEDURE — 3079F DIAST BP 80-89 MM HG: CPT | Mod: CPTII,S$GLB,, | Performed by: NURSE PRACTITIONER

## 2025-06-03 PROCEDURE — 3061F NEG MICROALBUMINURIA REV: CPT | Mod: CPTII,S$GLB,, | Performed by: NURSE PRACTITIONER

## 2025-06-03 PROCEDURE — 99999 PR PBB SHADOW E&M-EST. PATIENT-LVL V: CPT | Mod: PBBFAC,,, | Performed by: NURSE PRACTITIONER

## 2025-06-03 NOTE — PROGRESS NOTES
[facility]    CC: Morbid obesity    Chief Complaint   Patient presents with    Hyperlipidemia       Patient ID: Ian Dent is a 54 y.o. female with a past medical history of GERD, morbid obesity,     HPI    Here for pre-op work up for bariatric surgery   Follows [facility] with request for stress testing and EKG from cardiology     Echo 8/2022 normal EF    HTN; did not take BP meds today   Reports SBP 120s normally at home     HLD; on statin     CHA; on CPAP    DMT2; A1c 5.9    Denies [facility]  Nonsmoker     Morbid obesity; good candidate for bariatric surgery    Denies CP, SOB/GIL, orthopnea, PND, syncope, palps, LE edema.          Past Medical History:   Diagnosis Date    Anxiety     Anxiety     Colon polyp 2015    Diabetes     Edema     Environmental and seasonal allergies     Fever blister     Foot pain     GERD (gastroesophageal reflux disease)     Gout     Hyperlipidemia     Hypertension     Insomnia     Morbid obesity     CHA on CPAP     Ovarian cystic mass      Past Surgical History:   Procedure Laterality Date    BTL Bilateral     CHOLECYSTECTOMY      COLONOSCOPY  2009    incomplete 2009    COLONOSCOPY N/A 10/08/2015    polyp--Procedure: COLONOSCOPY;  Surgeon: Maisha Carlton MD;  Location: [facility];  Service: Endoscopy;  Laterality: N/A;    COLONOSCOPY N/A 07/17/2019    Procedure: COLONOSCOPY;  Surgeon: Aleta Lemons MD;  Location: [facility];  Service: Endoscopy;  Laterality: N/A;    COLONOSCOPY N/A 01/11/2024    Procedure: COLONOSCOPY;  Surgeon: Petros Kirk MD;  Location: [facility];  Service: Endoscopy;  Laterality: N/A;    CYSTOSCOPY      DIAGNOSTIC LAPAROSCOPY N/A 10/03/2018    Procedure: LAPAROSCOPY, DIAGNOSTIC;  Surgeon: Marianela Barrera MD;  Location: [facility];  Service: OB/GYN;  Laterality: N/A;    ESOPHAGOGASTRODUODENOSCOPY      HYSTERECTOMY  2012    LAPAROSCOPIC LYSIS OF ADHESIONS N/A 10/03/2018    Procedure: LYSIS, ADHESIONS, LAPAROSCOPIC;  Surgeon: Marianela  MD [place];  Location: [facility];  Service: OB/GYN;  Laterality: N/A;  extensive    LAPAROSCOPIC LYSIS OF ADHESIONS N/A 10/03/2018    Procedure: LYSIS, ADHESIONS, LAPAROSCOPIC;  Surgeon: Brent Warren MD;  Location: [facility];  Service: General;  Laterality: N/A;  extensive    LAPAROSCOPIC SURGICAL REMOVAL OF CYST OF OVARY Right 10/03/2018    Procedure: EXCISION, CYST, OVARY, LAPAROSCOPIC;  Surgeon: Marianela Barrera MD;  Location: [facility];  Service: OB/GYN;  Laterality: Right;    SKIN BIOPSY      TONSILLECTOMY       Social History[1]  Family History   Problem Relation Name Age of Onset    Hyperlipidemia Mother      Hypertension Mother      Stroke Mother      Hypertension Father      Diabetes Father      Stroke Father      Colon cancer Neg Hx         Review of patient's allergies indicates:   Allergen Reactions    Claritin [loratadine] Other (See Comments)     Stomach burn  Nausea and vomiting  Lightheaddness         Medication List with Changes/Refills   Current Medications    ALBUTEROL (PROVENTIL/VENTOLIN HFA) 90 MCG/ACTUATION INHALER    Inhale 2 puffs into the lungs every 6 (six) hours as needed for Wheezing. Rescue    ALLOPURINOL (ZYLOPRIM) 300 MG TABLET    Take 1 tablet (300 mg total) by mouth once daily.    ATORVASTATIN (LIPITOR) 40 MG TABLET    Take 1 tablet (40 mg total) by mouth once daily.    BUDESONIDE 1 MG/2 ML NBSP    EMPTY CONTENTS OF 1 VIAL INTO NASAL IRRIGATION SYSTEM, ADD DISTILLED WATER, SALT PACK, MIX & IRRIGATE. PERFORM TWICE DAILY    BUDESONIDE-FORMOTEROL 160-4.5 MCG (SYMBICORT) 160-4.5 MCG/ACTUATION HFAA    Inhale 2 puffs into the lungs every 12 (twelve) hours. Controller    CETIRIZINE (ZYRTEC) 10 MG TABLET    Take 1 tablet (10 mg total) by mouth once daily.    CLONAZEPAM (KLONOPIN) 0.5 MG TABLET    Take 1 tablet (0.5 mg total) by mouth 2 (two) times daily as needed for Anxiety (anxiety).    CONJUGATED ESTROGENS (PREMARIN) VAGINAL CREAM    APPLY FINGERTIP AMOUNT AROUND URETHRA AND VAGINAL AREA  "ONCE EVERY OTHER DAY.    ERGOCALCIFEROL (ERGOCALCIFEROL) 50,000 UNIT CAP    Take 1 capsule by mouth once a week    FLUTICASONE PROPIONATE (FLONASE) 50 MCG/ACTUATION NASAL SPRAY    1 spray (50 mcg total) by Each Nostril route once daily.    FUROSEMIDE (LASIX) 40 MG TABLET    Take 1 tablet (40 mg total) by mouth once daily.    HYDROXYZINE PAMOATE (VISTARIL) 25 MG CAP    Take 1 capsule (25 mg total) by mouth every 6 (six) hours as needed (itching).    LOSARTAN (COZAAR) 25 MG TABLET    Take 1 tablet (25 mg total) by mouth once daily.    MIRABEGRON (MYRBETRIQ) 25 MG TB24 ER TABLET    Take 1 tablet (25 mg total) by mouth once daily.    MONTELUKAST (SINGULAIR) 10 MG TABLET    Take 1 tablet (10 mg total) by mouth every evening. Allergy pill #2    MOUNJARO 12.5 MG/0.5 ML PNIJ    Inject 12.5 mg into the skin every 7 days.    NAPROXEN SODIUM (ANAPROX) 550 MG TABLET    Take 1 tablet (550 mg total) by mouth 2 (two) times daily with meals.    SPIRONOLACTONE (ALDACTONE) 25 MG TABLET    TAKE 1 TABLET BY MOUTH ONCE DAILY FOR HIGH BLOOD PRESSURE AND FOR FLUID    TRAZODONE (DESYREL) 150 MG TABLET    Take 0.5-2 tablets ( mg total) by mouth nightly as needed for Insomnia.           ROS    Vitals:    06/03/25 1258   BP: (!) 143/88   Pulse: 80   Resp: 18   SpO2: 99%   Weight: (!) 163.4 kg (360 lb 3.2 oz)   Height: 5' 7" (1.702 m)          Physical Exam      Labs:  Most Recent Data  CBC:   Lab Results   Component Value Date    WBC 7.50 04/21/2025    HGB 13.5 04/21/2025    HCT 43.4 04/21/2025     04/21/2025    MCV 87 04/21/2025    RDW 16.5 (H) 04/21/2025     BMP:   Lab Results   Component Value Date     04/21/2025    K 4.0 04/21/2025     04/21/2025    CO2 28 04/21/2025    BUN 10 04/21/2025    CREATININE 0.9 04/21/2025     04/21/2025    CALCIUM 8.6 (L) 04/21/2025    MG 1.8 07/10/2024    PHOS 2.8 07/10/2024     LFTS;   Lab Results   Component Value Date    PROT 6.9 04/21/2025    ALBUMIN 3.4 (L) 04/21/2025    " BILITOT 0.4 04/21/2025    AST 14 04/21/2025    ALKPHOS 86 04/21/2025    ALT 13 04/21/2025     COAGS:   Lab Results   Component Value Date    INR 1.0 06/26/2020     FLP:   Lab Results   Component Value Date    CHOL 177 04/21/2025    HDL 52 04/21/2025    LDLCALC 105.2 04/21/2025    TRIG 99 04/21/2025    CHOLHDL 29.4 04/21/2025     CARDIAC:   Lab Results   Component Value Date    TROPONINI <0.006 06/26/2020    BNP <10 06/26/2020       Imaging:    EKG:    Echo:    Stress Testing:    ***  I have personally reviewed these images and echo data    Assessment/Plan:  Problem List Items Addressed This Visit       Mixed hyperlipidemia    Morbid obesity with BMI of 50.0-59.9, adult    Essential hypertension - Primary (Chronic)    Type 2 diabetes mellitus with microalbuminuria, without long-term current use of insulin    Chest tightness       No follow-ups on file.      ***     Total duration of face to face visit time {[facility] VISIT MINUTES:29597}.  Total time spent counseling greater than fifty percent of total visit time.  Counseling included discussion regarding imaging findings, diagnosis, possibilities, treatment options, risks and benefits.  The patient had many questions regarding the options and long-term effects.    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    Tobacco Use    Smoking status: Never    Smokeless tobacco: Never   Substance and Sexual Activity    Alcohol use: No     Alcohol/week: 0.0 standard drinks of alcohol    Drug use: No    Sexual activity: Yes     Partners: Male     Birth control/protection: See Surgical Hx     Comment: partner of 29 years     Social Drivers of Health     Financial Resource Strain: Low Risk  (4/6/2025)    Overall Financial Resource Strain (CARDIA)     Difficulty of Paying Living Expenses: Not very  hard   Food Insecurity: Food Insecurity Present (4/6/2025)    Hunger Vital Sign     Worried About Running Out of Food in the Last Year: Sometimes true     Ran Out of Food in the Last Year: Sometimes true   Transportation Needs: No Transportation Needs (4/6/2025)    PRAPARE - Transportation     Lack of Transportation (Medical): No     Lack of Transportation (Non-Medical): No   Physical Activity: Insufficiently Active (4/6/2025)    Exercise Vital Sign     Days of Exercise per Week: 3 days     Minutes of Exercise per Session: 30 min   Stress: Stress Concern Present (4/6/2025)    [employer] [place] [employer] - Occupational Stress Questionnaire     Feeling of Stress : Very much   Housing Stability: Low Risk  (4/6/2025)    Housing Stability Vital Sign     Unable to Pay for Housing in the Last Year: No     Homeless in the Last Year: No

## 2025-06-04 LAB
OHS QRS DURATION: 78 MS
OHS QTC CALCULATION: 453 MS

## 2025-06-10 ENCOUNTER — PATIENT MESSAGE (OUTPATIENT)
Dept: ADMINISTRATIVE | Facility: HOSPITAL | Age: 55
End: 2025-06-10
Payer: COMMERCIAL

## 2025-06-18 ENCOUNTER — CLINICAL SUPPORT (OUTPATIENT)
Dept: ENDOSCOPY | Facility: HOSPITAL | Age: 55
End: 2025-06-18
Payer: COMMERCIAL

## 2025-06-18 DIAGNOSIS — K21.00 GASTROESOPHAGEAL REFLUX DISEASE WITH ESOPHAGITIS WITHOUT HEMORRHAGE: ICD-10-CM

## 2025-06-18 DIAGNOSIS — E66.01 MORBID OBESITY WITH BMI OF 50.0-59.9, ADULT: ICD-10-CM

## 2025-07-07 ENCOUNTER — PATIENT MESSAGE (OUTPATIENT)
Dept: BARIATRICS | Facility: CLINIC | Age: 55
End: 2025-07-07

## 2025-07-08 ENCOUNTER — PATIENT MESSAGE (OUTPATIENT)
Dept: BARIATRICS | Facility: CLINIC | Age: 55
End: 2025-07-08
Payer: COMMERCIAL

## 2025-07-16 ENCOUNTER — TELEPHONE (OUTPATIENT)
Dept: BARIATRICS | Facility: CLINIC | Age: 55
End: 2025-07-16
Payer: COMMERCIAL

## 2025-07-16 NOTE — TELEPHONE ENCOUNTER
Chart reviewed. Called pt to discuss wkup. No answer, LVM w/cb name and number. Will send portal message. RN f/u updated.

## 2025-08-01 PROBLEM — Q61.02 MULTIPLE RENAL CYSTS: Status: ACTIVE | Noted: 2025-08-01

## 2025-08-04 ENCOUNTER — TELEPHONE (OUTPATIENT)
Dept: BARIATRICS | Facility: CLINIC | Age: 55
End: 2025-08-04
Payer: COMMERCIAL

## 2025-08-04 NOTE — TELEPHONE ENCOUNTER
Chart reviewed. Called pt to discuss wkup. No answer, LVM w/cb name and number. Will send portal message. RN f/u updated.  Needed  - stress test  - EGD  - diet clearance   - psych evaluation (call [phone] to schedule)  - UGI (this is a swallowing test)  - fasting labs (these have been added to your scheduled fasting labs on 8/7/25)  - cardiac clearance (I will let you know when to obtain this. It is only go for 3-6 months depending on your health history.)

## 2025-08-12 ENCOUNTER — PATIENT MESSAGE (OUTPATIENT)
Dept: BARIATRICS | Facility: CLINIC | Age: 55
End: 2025-08-12
Payer: COMMERCIAL

## 2025-08-27 ENCOUNTER — TELEPHONE (OUTPATIENT)
Dept: BARIATRICS | Facility: CLINIC | Age: 55
End: 2025-08-27
Payer: COMMERCIAL